# Patient Record
Sex: FEMALE | Race: WHITE | Employment: OTHER | URBAN - METROPOLITAN AREA
[De-identification: names, ages, dates, MRNs, and addresses within clinical notes are randomized per-mention and may not be internally consistent; named-entity substitution may affect disease eponyms.]

---

## 2021-03-17 PROBLEM — I10 HTN (HYPERTENSION), BENIGN: Status: ACTIVE | Noted: 2021-03-17

## 2021-03-17 PROBLEM — E03.9 ACQUIRED HYPOTHYROIDISM: Status: ACTIVE | Noted: 2021-03-17

## 2021-03-17 PROBLEM — F02.80 EARLY ONSET ALZHEIMER'S DEMENTIA (HCC): Status: ACTIVE | Noted: 2021-03-17

## 2021-03-17 PROBLEM — G30.0 EARLY ONSET ALZHEIMER'S DEMENTIA (HCC): Status: ACTIVE | Noted: 2021-03-17

## 2021-03-17 PROBLEM — E78.2 MIXED HYPERLIPIDEMIA: Status: ACTIVE | Noted: 2021-03-17

## 2024-02-18 ENCOUNTER — APPOINTMENT (OUTPATIENT)
Dept: CT IMAGING | Age: 79
End: 2024-02-18
Attending: EMERGENCY MEDICINE
Payer: MEDICARE

## 2024-02-18 ENCOUNTER — HOSPITAL ENCOUNTER (EMERGENCY)
Age: 79
Discharge: HOME | End: 2024-02-18
Attending: EMERGENCY MEDICINE
Payer: MEDICARE

## 2024-02-18 VITALS
BODY MASS INDEX: 24.17 KG/M2 | OXYGEN SATURATION: 98 % | SYSTOLIC BLOOD PRESSURE: 172 MMHG | DIASTOLIC BLOOD PRESSURE: 95 MMHG | HEART RATE: 78 BPM | RESPIRATION RATE: 16 BRPM | WEIGHT: 128 LBS | HEIGHT: 61 IN | TEMPERATURE: 99 F

## 2024-02-18 DIAGNOSIS — S22.080A T12 COMPRESSION FRACTURE, INITIAL ENCOUNTER (HCC): Primary | ICD-10-CM

## 2024-02-18 LAB
ALBUMIN SERPL-MCNC: 3.6 G/DL (ref 3.4–5)
ALBUMIN/GLOB SERPL: 0.9 {RATIO} (ref 1–2)
ALP LIVER SERPL-CCNC: 53 U/L
ALT SERPL-CCNC: 12 U/L
ANION GAP SERPL CALC-SCNC: 8 MMOL/L (ref 0–18)
APTT PPP: 26.5 SECONDS (ref 23.3–35.6)
AST SERPL-CCNC: 11 U/L (ref 15–37)
BASOPHILS # BLD AUTO: 0.04 X10(3) UL (ref 0–0.2)
BASOPHILS NFR BLD AUTO: 0.5 %
BILIRUB SERPL-MCNC: 0.9 MG/DL (ref 0.1–2)
BILIRUB UR QL STRIP.AUTO: NEGATIVE
BUN BLD-MCNC: 24 MG/DL (ref 9–23)
CALCIUM BLD-MCNC: 8.9 MG/DL (ref 8.5–10.1)
CHLORIDE SERPL-SCNC: 99 MMOL/L (ref 98–112)
CLARITY UR REFRACT.AUTO: CLEAR
CO2 SERPL-SCNC: 26 MMOL/L (ref 21–32)
COLOR UR AUTO: YELLOW
CREAT BLD-MCNC: 1.54 MG/DL
EGFRCR SERPLBLD CKD-EPI 2021: 34 ML/MIN/1.73M2 (ref 60–?)
EOSINOPHIL # BLD AUTO: 0.02 X10(3) UL (ref 0–0.7)
EOSINOPHIL NFR BLD AUTO: 0.2 %
ERYTHROCYTE [DISTWIDTH] IN BLOOD BY AUTOMATED COUNT: 12.4 %
GLOBULIN PLAS-MCNC: 4.2 G/DL (ref 2.8–4.4)
GLUCOSE BLD-MCNC: 114 MG/DL (ref 70–99)
GLUCOSE UR STRIP.AUTO-MCNC: NEGATIVE MG/DL
HCT VFR BLD AUTO: 38.7 %
HGB BLD-MCNC: 13.8 G/DL
IMM GRANULOCYTES # BLD AUTO: 0.03 X10(3) UL (ref 0–1)
IMM GRANULOCYTES NFR BLD: 0.4 %
INR BLD: 1.01 (ref 0.8–1.2)
KETONES UR STRIP.AUTO-MCNC: NEGATIVE MG/DL
LEUKOCYTE ESTERASE UR QL STRIP.AUTO: NEGATIVE
LIPASE SERPL-CCNC: 24 U/L (ref 13–75)
LYMPHOCYTES # BLD AUTO: 0.93 X10(3) UL (ref 1–4)
LYMPHOCYTES NFR BLD AUTO: 11.5 %
MCH RBC QN AUTO: 31.4 PG (ref 26–34)
MCHC RBC AUTO-ENTMCNC: 35.7 G/DL (ref 31–37)
MCV RBC AUTO: 88 FL
MONOCYTES # BLD AUTO: 0.37 X10(3) UL (ref 0.1–1)
MONOCYTES NFR BLD AUTO: 4.6 %
NEUTROPHILS # BLD AUTO: 6.69 X10 (3) UL (ref 1.5–7.7)
NEUTROPHILS # BLD AUTO: 6.69 X10(3) UL (ref 1.5–7.7)
NEUTROPHILS NFR BLD AUTO: 82.8 %
NITRITE UR QL STRIP.AUTO: NEGATIVE
OSMOLALITY SERPL CALC.SUM OF ELEC: 281 MOSM/KG (ref 275–295)
PH UR STRIP.AUTO: 6.5 [PH] (ref 5–8)
PLATELET # BLD AUTO: 226 10(3)UL (ref 150–450)
POTASSIUM SERPL-SCNC: 3.2 MMOL/L (ref 3.5–5.1)
PROT SERPL-MCNC: 7.8 G/DL (ref 6.4–8.2)
PROT UR STRIP.AUTO-MCNC: >=300 MG/DL
PROTHROMBIN TIME: 13.3 SECONDS (ref 11.6–14.8)
RBC # BLD AUTO: 4.4 X10(6)UL
RBC #/AREA URNS AUTO: >10 /HPF
SODIUM SERPL-SCNC: 133 MMOL/L (ref 136–145)
SP GR UR STRIP.AUTO: 1.02 (ref 1–1.03)
TROPONIN I SERPL HS-MCNC: 14 NG/L
UROBILINOGEN UR STRIP.AUTO-MCNC: 0.2 MG/DL
WBC # BLD AUTO: 8.1 X10(3) UL (ref 4–11)

## 2024-02-18 PROCEDURE — 99284 EMERGENCY DEPT VISIT MOD MDM: CPT

## 2024-02-18 PROCEDURE — 81001 URINALYSIS AUTO W/SCOPE: CPT | Performed by: EMERGENCY MEDICINE

## 2024-02-18 PROCEDURE — 81015 MICROSCOPIC EXAM OF URINE: CPT | Performed by: EMERGENCY MEDICINE

## 2024-02-18 PROCEDURE — 85025 COMPLETE CBC W/AUTO DIFF WBC: CPT | Performed by: EMERGENCY MEDICINE

## 2024-02-18 PROCEDURE — 84484 ASSAY OF TROPONIN QUANT: CPT | Performed by: EMERGENCY MEDICINE

## 2024-02-18 PROCEDURE — 80053 COMPREHEN METABOLIC PANEL: CPT | Performed by: EMERGENCY MEDICINE

## 2024-02-18 PROCEDURE — 36415 COLL VENOUS BLD VENIPUNCTURE: CPT

## 2024-02-18 PROCEDURE — 74174 CTA ABD&PLVS W/CONTRAST: CPT | Performed by: EMERGENCY MEDICINE

## 2024-02-18 PROCEDURE — 99285 EMERGENCY DEPT VISIT HI MDM: CPT

## 2024-02-18 PROCEDURE — 85730 THROMBOPLASTIN TIME PARTIAL: CPT | Performed by: EMERGENCY MEDICINE

## 2024-02-18 PROCEDURE — 85610 PROTHROMBIN TIME: CPT | Performed by: EMERGENCY MEDICINE

## 2024-02-18 PROCEDURE — 83690 ASSAY OF LIPASE: CPT | Performed by: EMERGENCY MEDICINE

## 2024-02-18 RX ORDER — MINOXIDIL 2.5 MG/1
2.5 TABLET ORAL DAILY
COMMUNITY

## 2024-02-18 RX ORDER — HYDROCODONE BITARTRATE AND ACETAMINOPHEN 5; 325 MG/1; MG/1
1 TABLET ORAL EVERY 6 HOURS PRN
Qty: 10 TABLET | Refills: 0 | Status: SHIPPED | OUTPATIENT
Start: 2024-02-18 | End: 2024-02-23

## 2024-02-18 NOTE — ED INITIAL ASSESSMENT (HPI)
Bilateral lower back pain, hurts to move, no known injury, denies urinary symptoms, hurts to move legs, pt rubbing abdom saying it hurts,

## 2024-02-18 NOTE — ED PROVIDER NOTES
Patient Seen in: [facility] [place]      History     Chief Complaint   Patient presents with    Back Pain     Stated Complaint: lower back pain; sent from IC    Subjective:   HPI    78-year-old female with a past medical history as below including dementia, hypertension and hyperlipidemia brought by daughter due to back and abdominal pain which started yesterday.  Patient is a poor historian due to dementia with daughter assisting with much of the history.  Daughter states she initially was reporting back pain but then also had mid abdominal pain today.  Patient denies nausea, vomiting or diarrhea.  She denies urinary symptoms.  Daughter states that they were at immediate care prior to arrival and did a urine dipstick which did show some blood.  Patient denies fever, chills, cough or cold symptoms.  Denies chest pain or shortness of breath.    Objective:   Past Medical History:   Diagnosis Date    Dementia (HCC)     Essential hypertension     Hyperlipidemia     Renal disease               Past Surgical History:   Procedure Laterality Date    COLON SURGERY      HYSTERECTOMY      THYROIDECTOMY                  Social History     Socioeconomic History    Marital status:    Tobacco Use    Smoking status: Never    Smokeless tobacco: Never   Vaping Use    Vaping Use: Never used   Substance and Sexual Activity    Alcohol use: Not Currently    Drug use: Never              Review of Systems    Positive for stated complaint: lower back pain; sent from IC  Other systems are as noted in HPI.  Constitutional and vital signs reviewed.      All other systems reviewed and negative except as noted above.    Physical Exam     ED Triage Vitals [02/18/24 1619]   BP (!) 227/110   Pulse 73   Resp 20   Temp 99.2 °F (37.3 °C)   Temp src Temporal   SpO2 99 %   O2 Device None (Room air)       Current:BP (!) 172/95   Pulse 78   Temp 99.2 °F (37.3 °C) (Temporal)   Resp 16   Ht 154.9 cm (5' 1\")   Wt 58.1 kg    SpO2 98%   BMI 24.19 kg/m²         Physical Exam  Vitals and nursing note reviewed.   Constitutional:       Appearance: She is well-developed.   HENT:      Head: Normocephalic and atraumatic.      Mouth/Throat:      Mouth: Mucous membranes are moist.   Eyes:      General: No scleral icterus.  Cardiovascular:      Rate and Rhythm: Normal rate and regular rhythm.   Pulmonary:      Effort: Pulmonary effort is normal.      Breath sounds: Normal breath sounds.   Abdominal:      General: There is no distension.      Palpations: Abdomen is soft.      Tenderness: There is abdominal tenderness. There is no guarding or rebound.      Comments: Mild, diffuse nonfocal tenderness   Musculoskeletal:      Comments: Midline upper lumbar/lower thoracic tenderness along bilateral paraspinal muscle and bilateral CVA tenderness   Skin:     General: Skin is warm and dry.   Neurological:      General: No focal deficit present.      Mental Status: She is alert and oriented to person, place, and time.      Cranial Nerves: No cranial nerve deficit.      Motor: No weakness.   Psychiatric:         Mood and Affect: Mood normal.         Behavior: Behavior normal.               ED Course     Labs Reviewed   COMP METABOLIC PANEL (14) - Abnormal; Notable for the following components:       Result Value    Glucose 114 (*)     Sodium 133 (*)     Potassium 3.2 (*)     BUN 24 (*)     Creatinine 1.54 (*)     eGFR-Cr 34 (*)     AST 11 (*)     ALT 12 (*)     Alkaline Phosphatase 53 (*)     A/G Ratio 0.9 (*)     All other components within normal limits   URINALYSIS WITH CULTURE REFLEX - Abnormal; Notable for the following components:    Blood Urine Large (*)     Protein Urine >=300 (*)     All other components within normal limits   UA MICROSCOPIC ONLY, URINE - Abnormal; Notable for the following components:    RBC Urine >10 (*)     Bacteria Urine Rare (*)     Squamous Epi. Cells Few (*)     All other components within normal limits   CBC W/  DIFFERENTIAL - Abnormal; Notable for the following components:    Lymphocyte Absolute 0.93 (*)     All other components within normal limits   PROTHROMBIN TIME (PT) - Normal   PTT, ACTIVATED - Normal   TROPONIN I HIGH SENSITIVITY - Normal   LIPASE - Normal   CBC WITH DIFFERENTIAL WITH PLATELET    Narrative:     The following orders were created for panel order CBC With Differential With Platelet.  Procedure                               Abnormality         Status                     ---------                               -----------         ------                     CBC W/ DIFFERENTIAL[[id number]]          Abnormal            Final result                 Please view results for these tests on the individual orders.   RAINBOW DRAW LAVENDER   RAINBOW DRAW LIGHT GREEN   RAINBOW DRAW BLUE             CTA ABD/PEL (CPT=74174)    Result Date: 2/18/2024  CONCLUSION:  1. Age indeterminate compression fracture at T12 with 3 millimeters of retropulsion.  Correlate clinically for symptoms.  Further evaluation with thoracic spine MRI may be obtained as clinically indicated. 2. Status post cholecystectomy.  The common bile duct is measured at 1.4 centimeters, which may be secondary to prior cholecystectomy though remains indeterminate.  There is pneumobilia, correlation to prior surgical history is recommended. 3. Mildly prominent small bowel in the pelvis measuring up to 2.1 centimeters.  Collapsed small bowel more distally, without definite transition point.  Correlate clinically for findings of small bowel obstruction.   LOCATION:  [facility]   Dictated by (CST): Wong Chowdary MD on 2/18/2024 at 5:56 PM     Finalized by (CST): Wong Chowdary MD on 2/18/2024 at 6:04 PM               MDM   78-year-old female with a past medical history as below including dementia, hypertension and hyperlipidemia brought by daughter due to back and abdominal pain which started yesterday.      Differential includes but is not limited to back strain,  vertebral compression fracture, ureteral calculus, UTI/pyelonephritis, pancreatitis, AAA/aortic dissection    Labs show mild CKD with creatinine at baseline.  Labs are otherwise unremarkable with normal WBC, hemoglobin, LFTs and lipase.    Independent interpretation of CTA abdomen/pelvis shows no evidence of AAA or dissection but does show T12 compression fracture.  Radiology report reviewed as above noting age-indeterminate compression fracture however patient does have point tenderness in that area.  CT also notes status post cholecystectomy with common bile duct mildly enlarged, likely due to cholecystectomy as patient has no significant point tenderness in that area.  Radiology report also notes some mildly prominent small bowel loops in the pelvis.  Patient has no nausea, vomiting or lower abdominal tenderness concerning for bowel obstruction.    Patient's pain is consistent with T12 compression fracture.  She has no findings that are concerning for cord compression.    Discussed management with daughter states she has been treating her with Tylenol with some improvement.  Will give short course of Norco if needed.  Instructed to follow-up with spine specialist for further outpatient management.  Return precaution discussed.      Medical Decision Making  Amount and/or Complexity of Data Reviewed  Independent Historian: caregiver     Details: Daughter, see HPI  Labs: ordered. Decision-making details documented in ED Course.  Radiology: ordered and independent interpretation performed. Decision-making details documented in ED Course.    Risk  Prescription drug management.        Disposition and Plan     Clinical Impression:  1. T12 compression fracture, initial encounter ([facility])         Disposition:  Discharge  2/18/2024  7:15 pm    Follow-up:  Irene Lindo MD  [address] [facility] [address]  [phone]    Schedule an appointment as soon as possible for a visit      IRIS Neff MD  [address] [place] [address]  [address]  [facility] [address]  [phone]    Schedule an appointment as soon as possible for a visit            Medications Prescribed:  Discharge Medication List as of 2/18/2024  7:21 PM        START taking these medications    Details   HYDROcodone-acetaminophen 5-325 MG Oral Tab Take 1 tablet by mouth every 6 (six) hours as needed., Normal, Disp-10 tablet, R-0